# Patient Record
Sex: MALE | Race: BLACK OR AFRICAN AMERICAN | NOT HISPANIC OR LATINO | Employment: FULL TIME | URBAN - METROPOLITAN AREA
[De-identification: names, ages, dates, MRNs, and addresses within clinical notes are randomized per-mention and may not be internally consistent; named-entity substitution may affect disease eponyms.]

---

## 2017-01-10 ENCOUNTER — TRANSCRIBE ORDERS (OUTPATIENT)
Dept: ADMINISTRATIVE | Facility: HOSPITAL | Age: 47
End: 2017-01-10

## 2017-01-10 ENCOUNTER — ALLSCRIPTS OFFICE VISIT (OUTPATIENT)
Dept: OTHER | Facility: OTHER | Age: 47
End: 2017-01-10

## 2017-01-10 DIAGNOSIS — R20.2 PARESTHESIA: Primary | ICD-10-CM

## 2017-01-11 DIAGNOSIS — R20.2 PARESTHESIA OF SKIN: ICD-10-CM

## 2017-01-11 DIAGNOSIS — E11.65 TYPE 2 DIABETES MELLITUS WITH HYPERGLYCEMIA (HCC): ICD-10-CM

## 2017-01-11 DIAGNOSIS — E78.5 HYPERLIPIDEMIA: ICD-10-CM

## 2017-01-11 DIAGNOSIS — Z72.0 TOBACCO USE: ICD-10-CM

## 2017-01-11 DIAGNOSIS — E55.9 VITAMIN D DEFICIENCY: ICD-10-CM

## 2017-01-11 DIAGNOSIS — I10 ESSENTIAL (PRIMARY) HYPERTENSION: ICD-10-CM

## 2017-02-23 ENCOUNTER — GENERIC CONVERSION - ENCOUNTER (OUTPATIENT)
Dept: OTHER | Facility: OTHER | Age: 47
End: 2017-02-23

## 2017-03-07 ENCOUNTER — ALLSCRIPTS OFFICE VISIT (OUTPATIENT)
Dept: OTHER | Facility: OTHER | Age: 47
End: 2017-03-07

## 2017-03-07 DIAGNOSIS — R31.29 OTHER MICROSCOPIC HEMATURIA: ICD-10-CM

## 2017-03-13 ENCOUNTER — GENERIC CONVERSION - ENCOUNTER (OUTPATIENT)
Dept: OTHER | Facility: OTHER | Age: 47
End: 2017-03-13

## 2017-03-20 ENCOUNTER — GENERIC CONVERSION - ENCOUNTER (OUTPATIENT)
Dept: OTHER | Facility: OTHER | Age: 47
End: 2017-03-20

## 2017-03-21 ENCOUNTER — HOSPITAL ENCOUNTER (OUTPATIENT)
Dept: ULTRASOUND IMAGING | Facility: HOSPITAL | Age: 47
Discharge: HOME | End: 2017-03-21
Payer: COMMERCIAL

## 2017-03-21 DIAGNOSIS — R31.29 OTHER MICROSCOPIC HEMATURIA: ICD-10-CM

## 2017-03-21 PROCEDURE — 76770 US EXAM ABDO BACK WALL COMP: CPT

## 2017-03-23 ENCOUNTER — GENERIC CONVERSION - ENCOUNTER (OUTPATIENT)
Dept: OTHER | Facility: OTHER | Age: 47
End: 2017-03-23

## 2017-04-01 ENCOUNTER — HOSPITAL ENCOUNTER (EMERGENCY)
Facility: HOSPITAL | Age: 47
Discharge: HOME | End: 2017-04-01
Attending: EMERGENCY MEDICINE | Admitting: EMERGENCY MEDICINE
Payer: COMMERCIAL

## 2017-04-01 ENCOUNTER — APPOINTMENT (EMERGENCY)
Dept: RADIOLOGY | Facility: HOSPITAL | Age: 47
End: 2017-04-01
Payer: COMMERCIAL

## 2017-04-01 VITALS
HEART RATE: 88 BPM | SYSTOLIC BLOOD PRESSURE: 128 MMHG | RESPIRATION RATE: 20 BRPM | DIASTOLIC BLOOD PRESSURE: 84 MMHG | WEIGHT: 245 LBS | OXYGEN SATURATION: 95 % | TEMPERATURE: 98 F

## 2017-04-01 DIAGNOSIS — R10.9 ABDOMINAL PAIN: ICD-10-CM

## 2017-04-01 DIAGNOSIS — N20.0 KIDNEY STONE: Primary | ICD-10-CM

## 2017-04-01 LAB
ALBUMIN SERPL BCP-MCNC: 4 G/DL (ref 3.5–5)
ALP SERPL-CCNC: 70 U/L (ref 46–116)
ALT SERPL W P-5'-P-CCNC: 31 U/L (ref 12–78)
ANION GAP BLD CALC-SCNC: 15 MMOL/L (ref 4–13)
ANION GAP SERPL CALCULATED.3IONS-SCNC: 7 MMOL/L (ref 4–13)
AST SERPL W P-5'-P-CCNC: 26 U/L (ref 5–45)
BACTERIA UR QL AUTO: ABNORMAL /HPF
BILIRUB SERPL-MCNC: 0.17 MG/DL (ref 0.2–1)
BILIRUB UR QL STRIP: NEGATIVE
BUN BLD-MCNC: 25 MG/DL (ref 5–25)
BUN SERPL-MCNC: 20 MG/DL (ref 5–25)
CA-I BLD-SCNC: 1.16 MMOL/L (ref 1.12–1.32)
CALCIUM SERPL-MCNC: 9.4 MG/DL (ref 8.3–10.1)
CHLORIDE BLD-SCNC: 103 MMOL/L (ref 100–108)
CHLORIDE SERPL-SCNC: 105 MMOL/L (ref 100–108)
CLARITY UR: CLEAR
CO2 SERPL-SCNC: 28 MMOL/L (ref 21–32)
COLOR UR: YELLOW
COLOR, POC: NORMAL
CREAT BLD-MCNC: 1.1 MG/DL (ref 0.6–1.3)
CREAT SERPL-MCNC: 1.31 MG/DL (ref 0.6–1.3)
GFR SERPL CREATININE-BSD FRML MDRD: >60 ML/MIN/1.73SQ M
GFR SERPL CREATININE-BSD FRML MDRD: >60 ML/MIN/1.73SQ M
GLUCOSE SERPL-MCNC: 135 MG/DL (ref 65–140)
GLUCOSE SERPL-MCNC: 144 MG/DL (ref 65–140)
GLUCOSE UR STRIP-MCNC: NEGATIVE MG/DL
HCT VFR BLD CALC: 45 % (ref 36.5–49.3)
HGB BLDA-MCNC: 15.3 G/DL (ref 12–17)
HGB UR QL STRIP.AUTO: ABNORMAL
HYALINE CASTS #/AREA URNS LPF: ABNORMAL /LPF
KETONES UR STRIP-MCNC: NEGATIVE MG/DL
LEUKOCYTE ESTERASE UR QL STRIP: NEGATIVE
LIPASE SERPL-CCNC: 167 U/L (ref 73–393)
NITRITE UR QL STRIP: NEGATIVE
NON-SQ EPI CELLS URNS QL MICRO: ABNORMAL /HPF
PCO2 BLD: 27 MMOL/L (ref 21–32)
PH UR STRIP.AUTO: 5.5 [PH] (ref 4.5–8)
POTASSIUM BLD-SCNC: 4.6 MMOL/L (ref 3.5–5.3)
POTASSIUM SERPL-SCNC: 4.3 MMOL/L (ref 3.5–5.3)
PROT SERPL-MCNC: 8.1 G/DL (ref 6.4–8.2)
PROT UR STRIP-MCNC: NEGATIVE MG/DL
RBC #/AREA URNS AUTO: ABNORMAL /HPF
SODIUM BLD-SCNC: 140 MMOL/L (ref 136–145)
SODIUM SERPL-SCNC: 140 MMOL/L (ref 136–145)
SP GR UR STRIP.AUTO: 1.01 (ref 1–1.03)
SPECIMEN SOURCE: ABNORMAL
UROBILINOGEN UR QL STRIP.AUTO: 0.2 E.U./DL
WBC #/AREA URNS AUTO: ABNORMAL /HPF

## 2017-04-01 PROCEDURE — 74177 CT ABD & PELVIS W/CONTRAST: CPT

## 2017-04-01 PROCEDURE — 85014 HEMATOCRIT: CPT

## 2017-04-01 PROCEDURE — 83690 ASSAY OF LIPASE: CPT | Performed by: EMERGENCY MEDICINE

## 2017-04-01 PROCEDURE — 99284 EMERGENCY DEPT VISIT MOD MDM: CPT

## 2017-04-01 PROCEDURE — 96374 THER/PROPH/DIAG INJ IV PUSH: CPT

## 2017-04-01 PROCEDURE — 80053 COMPREHEN METABOLIC PANEL: CPT | Performed by: EMERGENCY MEDICINE

## 2017-04-01 PROCEDURE — 87086 URINE CULTURE/COLONY COUNT: CPT

## 2017-04-01 PROCEDURE — 81001 URINALYSIS AUTO W/SCOPE: CPT

## 2017-04-01 PROCEDURE — 96361 HYDRATE IV INFUSION ADD-ON: CPT

## 2017-04-01 PROCEDURE — 80047 BASIC METABLC PNL IONIZED CA: CPT

## 2017-04-01 PROCEDURE — 81002 URINALYSIS NONAUTO W/O SCOPE: CPT | Performed by: EMERGENCY MEDICINE

## 2017-04-01 PROCEDURE — 36415 COLL VENOUS BLD VENIPUNCTURE: CPT | Performed by: EMERGENCY MEDICINE

## 2017-04-01 RX ORDER — IBUPROFEN 600 MG/1
600 TABLET ORAL EVERY 6 HOURS PRN
Qty: 30 TABLET | Refills: 0 | Status: SHIPPED | OUTPATIENT
Start: 2017-04-01 | End: 2017-10-14

## 2017-04-01 RX ORDER — KETOROLAC TROMETHAMINE 30 MG/ML
15 INJECTION, SOLUTION INTRAMUSCULAR; INTRAVENOUS ONCE
Status: COMPLETED | OUTPATIENT
Start: 2017-04-01 | End: 2017-04-01

## 2017-04-01 RX ADMIN — SODIUM CHLORIDE 1000 ML: 0.9 INJECTION, SOLUTION INTRAVENOUS at 19:02

## 2017-04-01 RX ADMIN — IOHEXOL 100 ML: 350 INJECTION, SOLUTION INTRAVENOUS at 19:38

## 2017-04-01 RX ADMIN — KETOROLAC TROMETHAMINE 15 MG: 30 INJECTION, SOLUTION INTRAMUSCULAR at 18:59

## 2017-04-02 LAB — BACTERIA UR CULT: NORMAL

## 2017-04-04 ENCOUNTER — TRANSCRIBE ORDERS (OUTPATIENT)
Dept: ADMINISTRATIVE | Facility: HOSPITAL | Age: 47
End: 2017-04-04

## 2017-04-04 DIAGNOSIS — I63.9 CEREBROVASCULAR ACCIDENT (CVA), UNSPECIFIED MECHANISM (HCC): Primary | ICD-10-CM

## 2017-04-04 DIAGNOSIS — M54.10 RADICULOPATHY, UNSPECIFIED SPINAL REGION: ICD-10-CM

## 2017-04-06 ENCOUNTER — TRANSCRIBE ORDERS (OUTPATIENT)
Dept: ADMINISTRATIVE | Facility: HOSPITAL | Age: 47
End: 2017-04-06

## 2017-04-06 DIAGNOSIS — D55.8 ANEMIA DUE TO ENZYME DEFICIENCY (HCC): ICD-10-CM

## 2017-04-06 DIAGNOSIS — M79.10 MYALGIA: ICD-10-CM

## 2017-04-06 DIAGNOSIS — E71.41 PRIMARY CARNITINE DEFICIENCY (HCC): ICD-10-CM

## 2017-04-06 DIAGNOSIS — G45.9 TRANSIENT CEREBRAL ISCHEMIA, UNSPECIFIED TYPE: ICD-10-CM

## 2017-04-06 DIAGNOSIS — R53.1 ASTHENIA: ICD-10-CM

## 2017-04-06 DIAGNOSIS — R29.890 LOSS OF HEIGHT: Primary | ICD-10-CM

## 2017-04-19 ENCOUNTER — GENERIC CONVERSION - ENCOUNTER (OUTPATIENT)
Dept: OTHER | Facility: OTHER | Age: 47
End: 2017-04-19

## 2017-04-22 ENCOUNTER — HOSPITAL ENCOUNTER (OUTPATIENT)
Dept: MRI IMAGING | Facility: HOSPITAL | Age: 47
End: 2017-04-22
Payer: COMMERCIAL

## 2017-04-22 ENCOUNTER — HOSPITAL ENCOUNTER (OUTPATIENT)
Dept: MRI IMAGING | Facility: HOSPITAL | Age: 47
Discharge: HOME | End: 2017-04-22
Payer: COMMERCIAL

## 2017-04-22 DIAGNOSIS — I63.9 CEREBROVASCULAR ACCIDENT (CVA), UNSPECIFIED MECHANISM (HCC): ICD-10-CM

## 2017-04-22 PROCEDURE — 70551 MRI BRAIN STEM W/O DYE: CPT

## 2017-05-11 ENCOUNTER — GENERIC CONVERSION - ENCOUNTER (OUTPATIENT)
Dept: OTHER | Facility: OTHER | Age: 47
End: 2017-05-11

## 2017-06-07 DIAGNOSIS — E55.9 VITAMIN D DEFICIENCY: ICD-10-CM

## 2017-06-07 DIAGNOSIS — Z80.0 FAMILY HISTORY OF MALIGNANT NEOPLASM OF DIGESTIVE ORGAN: ICD-10-CM

## 2017-06-07 DIAGNOSIS — I10 ESSENTIAL (PRIMARY) HYPERTENSION: ICD-10-CM

## 2017-06-07 DIAGNOSIS — E11.65 TYPE 2 DIABETES MELLITUS WITH HYPERGLYCEMIA (HCC): ICD-10-CM

## 2017-06-07 DIAGNOSIS — R20.2 PARESTHESIA OF SKIN: ICD-10-CM

## 2017-06-07 DIAGNOSIS — Z72.0 TOBACCO USE: ICD-10-CM

## 2017-06-07 DIAGNOSIS — R31.29 OTHER MICROSCOPIC HEMATURIA: ICD-10-CM

## 2017-06-07 DIAGNOSIS — E78.5 HYPERLIPIDEMIA: ICD-10-CM

## 2017-06-21 ENCOUNTER — GENERIC CONVERSION - ENCOUNTER (OUTPATIENT)
Dept: OTHER | Facility: OTHER | Age: 47
End: 2017-06-21

## 2017-07-28 ENCOUNTER — HOSPITAL ENCOUNTER (OUTPATIENT)
Dept: ULTRASOUND IMAGING | Facility: HOSPITAL | Age: 47
Discharge: HOME | End: 2017-07-28
Payer: COMMERCIAL

## 2017-07-28 ENCOUNTER — TRANSCRIBE ORDERS (OUTPATIENT)
Dept: ADMINISTRATIVE | Facility: HOSPITAL | Age: 47
End: 2017-07-28

## 2017-07-28 ENCOUNTER — APPOINTMENT (OUTPATIENT)
Dept: LAB | Facility: HOSPITAL | Age: 47
End: 2017-07-28
Payer: COMMERCIAL

## 2017-07-28 DIAGNOSIS — G45.9 TRANSIENT CEREBRAL ISCHEMIA, UNSPECIFIED TYPE: ICD-10-CM

## 2017-07-28 DIAGNOSIS — Z13.9 SCREENING FOR CONDITION: Primary | ICD-10-CM

## 2017-07-28 DIAGNOSIS — Z13.9 SCREENING FOR CONDITION: ICD-10-CM

## 2017-07-28 LAB
CK MB SERPL-MCNC: 3.4 NG/ML (ref 0–5)
CK MB SERPL-MCNC: <1 % (ref 0–2.5)
CK SERPL-CCNC: 996 U/L (ref 39–308)
LACTATE SERPL-SCNC: 0.5 MMOL/L (ref 0.5–2)

## 2017-07-28 PROCEDURE — 86235 NUCLEAR ANTIGEN ANTIBODY: CPT

## 2017-07-28 PROCEDURE — 84210 ASSAY OF PYRUVATE: CPT

## 2017-07-28 PROCEDURE — 83520 IMMUNOASSAY QUANT NOS NONAB: CPT

## 2017-07-28 PROCEDURE — 83605 ASSAY OF LACTIC ACID: CPT

## 2017-07-28 PROCEDURE — 36415 COLL VENOUS BLD VENIPUNCTURE: CPT

## 2017-07-28 PROCEDURE — 82550 ASSAY OF CK (CPK): CPT

## 2017-07-28 PROCEDURE — 93880 EXTRACRANIAL BILAT STUDY: CPT

## 2017-07-28 PROCEDURE — 84182 PROTEIN WESTERN BLOT TEST: CPT

## 2017-07-28 PROCEDURE — 82553 CREATINE MB FRACTION: CPT

## 2017-08-02 LAB
CARN ESTERS/C0 SERPL-SRTO: 0.5 RATIO (ref 0.1–0.9)
CARNITINE FREE SERPL-SCNC: 36 UMOL/L (ref 16–60)
CARNITINE SERPL-SCNC: 54 UMOL/L (ref 25–69)
PYRUVATE BLD-MCNC: 0.2 MG/DL (ref 0.3–0.7)
STONE ANALYSIS-IMP: NORMAL

## 2017-08-10 LAB
MISCELLANEOUS LAB TEST RESULT: NORMAL
MISCELLANEOUS LAB TEST RESULT: NORMAL

## 2017-10-14 ENCOUNTER — APPOINTMENT (EMERGENCY)
Dept: RADIOLOGY | Facility: HOSPITAL | Age: 47
End: 2017-10-14
Payer: COMMERCIAL

## 2017-10-14 ENCOUNTER — HOSPITAL ENCOUNTER (EMERGENCY)
Facility: HOSPITAL | Age: 47
Discharge: HOME | End: 2017-10-14
Attending: EMERGENCY MEDICINE | Admitting: EMERGENCY MEDICINE
Payer: COMMERCIAL

## 2017-10-14 VITALS
WEIGHT: 240 LBS | RESPIRATION RATE: 18 BRPM | TEMPERATURE: 98.3 F | HEART RATE: 98 BPM | SYSTOLIC BLOOD PRESSURE: 199 MMHG | DIASTOLIC BLOOD PRESSURE: 99 MMHG | OXYGEN SATURATION: 98 %

## 2017-10-14 DIAGNOSIS — M17.12 PRIMARY OSTEOARTHRITIS OF LEFT KNEE: ICD-10-CM

## 2017-10-14 DIAGNOSIS — M54.12 CERVICAL RADICULOPATHY: Primary | ICD-10-CM

## 2017-10-14 PROCEDURE — 99283 EMERGENCY DEPT VISIT LOW MDM: CPT

## 2017-10-14 PROCEDURE — 73562 X-RAY EXAM OF KNEE 3: CPT

## 2017-10-14 PROCEDURE — 72040 X-RAY EXAM NECK SPINE 2-3 VW: CPT

## 2017-10-14 RX ORDER — OXYCODONE AND ACETAMINOPHEN 10; 325 MG/1; MG/1
1 TABLET ORAL EVERY 4 HOURS PRN
COMMUNITY

## 2017-10-14 RX ORDER — BACLOFEN 10 MG/1
10 TABLET ORAL 2 TIMES DAILY
COMMUNITY
End: 2018-08-27

## 2017-10-14 NOTE — ED PROCEDURE NOTE
Procedure  Arthrocentesis  Date/Time: 10/14/2017 10:20 AM  Performed by: Gwendolyn Hodge  Authorized by: Crys Armendariz   Consent: Verbal consent obtained  Consent given by: patient  Patient understanding: patient states understanding of the procedure being performed  Site marked: the operative site was marked  Imaging studies: imaging studies available  Patient identity confirmed: verbally with patient  Time out: Immediately prior to procedure a "time out" was called to verify the correct patient, procedure, equipment, support staff and site/side marked as required    Indications: joint swelling and therapeutic   Body area: knee  Joint: left knee  Needle size: 18 G  Ultrasound guidance: no  Approach: lateral  Aspirate: yellow  Comments: 20 ccs yellow non-bloody fluid

## 2017-10-14 NOTE — ED ATTENDING ATTESTATION
Herrera Ang MD, saw and evaluated the patient  I have discussed the patient with the resident/non-physician practitioner and agree with the resident's/non-physician practitioner's findings, Plan of Care, and MDM as documented in the resident's/non-physician practitioner's note, except where noted  All available labs and Radiology studies were reviewed  At this point I agree with the current assessment done in the Emergency Department  I have conducted an independent evaluation of this patient a history and physical is as follows:Exascerbation Chronic muscle     Critical Care Time  CritCare Time  Pains which have been worked up by family medical doctor  Today presents with worsening left arm pain with tingling and numbness  Also has knee pain and knee  Patient has small left knee effusion    Some minimal weakness left upper extremity with positive Spurling sign

## 2017-10-14 NOTE — ED PROVIDER NOTES
History  Chief Complaint   Patient presents with    Knee Pain     Pt states he woke this morning with a swollen L knee pain denies pain in it  Pt also want to have his neck looked at for his pinched nerve  Pt states he had multiple injections in it  38-year-old male with a past medical history diabetes type 2, hypertension, chronic muscle pain, chronic left arm paresthesias presents emergency department for an exacerbation of his left arm paresthesia with shooting pain that originates from the cervical region  Patient states the pain is exacerbated with turning his head and side bending to the left  Patient states he has mild weakness in the left hand, which is chronic  This pain is rated at a 6/10 with radiation down to the fingers  Patient denies any other muscle or nerve type pains and trauma to the neck and Lt upper extremity  Patient states that he does not take any medications currently for his left arm symptoms and has not tried any NSAIDs  Patient also states he does not see a spine doctor, however, he does see pain management for his chronic muscle pains which he takes Percocet for pain as well as baclofen  Patient also complains of left knee effusion x1 day  Patient states that this morning he woke the swelling in the knee was significantly worse than yesterday this is that he has a hard time bending his knee  Patient denies any history of trauma or injury to that extremity  Patient denies history injury to his knee as well as try previous knee injuries in the past   Patient is able to ambulate without pain and has no pain at rest         History provided by:  Patient  Knee Pain   Location:  Knee  Knee location:  L knee  Pain details:     Quality:  Pressure  Associated symptoms: neck pain    Associated symptoms: no back pain and no fatigue        Prior to Admission Medications   Prescriptions Last Dose Informant Patient Reported?  Taking?   baclofen 10 mg tablet   Yes Yes   Sig: Take 10 mg by mouth 2 (two) times a day   oxyCODONE-acetaminophen (PERCOCET)  mg per tablet   Yes Yes   Sig: Take 1 tablet by mouth every 4 (four) hours as needed for moderate pain      Facility-Administered Medications: None       Past Medical History:   Diagnosis Date    Chronic pain     Diabetes mellitus ([facility] [address] )        Past Surgical History:   Procedure Laterality Date    APPENDECTOMY         History reviewed  No pertinent family history  I have reviewed and agree with the history as documented  Social History   Substance Use Topics    Smoking status: Current Every Day Smoker     Packs/day: 1 00     Types: Cigarettes    Smokeless tobacco: Current User    Alcohol use Yes        Review of Systems   Constitutional: Negative for chills and fatigue  Respiratory: Negative for cough, choking, shortness of breath, wheezing and stridor  Cardiovascular: Negative for chest pain, palpitations and leg swelling  Gastrointestinal: Negative for abdominal pain, nausea and vomiting  Musculoskeletal: Positive for joint swelling and neck pain  Negative for back pain, gait problem and myalgias  Skin: Negative for rash  Neurological: Negative for dizziness and headaches  Physical Exam  ED Triage Vitals [10/14/17 0755]   Temperature Pulse Respirations Blood Pressure SpO2   98 3 °F (36 8 °C) 98 18 (!) 199/99 98 %      Temp Source Heart Rate Source Patient Position - Orthostatic VS BP Location FiO2 (%)   Oral Monitor Sitting Right arm --      Pain Score       5           Physical Exam   Constitutional: He is oriented to person, place, and time  He appears well-developed and well-nourished  HENT:   Head: Normocephalic and atraumatic  Eyes: Conjunctivae are normal    Neck:       Tenderness to palpation over the lateral aspect of C4-5 as well as the Trap in that region  Cardiovascular: Normal rate, regular rhythm and normal heart sounds      Pulmonary/Chest: Effort normal and breath sounds normal    Abdominal: Soft  Bowel sounds are normal    Musculoskeletal:        Arms:       Legs:  Neurological: He is alert and oriented to person, place, and time  Skin: Skin is warm and dry  Capillary refill takes less than 2 seconds  No rash noted  Psychiatric: He has a normal mood and affect  His behavior is normal  Thought content normal    Nursing note and vitals reviewed  ED Medications  Medications - No data to display    Diagnostic Studies  Labs Reviewed - No data to display    XR cervical spine 2 or 3 views    (Results Pending)   XR knee 3 views left non injury    (Results Pending)       Procedures  Procedures      Phone Consults  ED Phone Contact    ED Course  ED Course                                MDM  Number of Diagnoses or Management Options  Cervical radiculopathy: established and worsening  Primary osteoarthritis of left knee: new and requires workup  Diagnosis management comments:   1  Osteoarthritis with Left Knee effusion  -Xray confirm osteoarthritis  -effusion drained with arthrocentesis with 25 ccs non-bloody yellow fluid    2  Cervical radiculopathy   -pain films show osteophytes in the cervical region  -f/u with sports medicine for both issues    -Ibuprofen 600mg Q6hr as needed for pain       Amount and/or Complexity of Data Reviewed  Tests in the radiology section of CPT®: reviewed and ordered    Risk of Complications, Morbidity, and/or Mortality  Presenting problems: low  Diagnostic procedures: low  Management options: low      CritCare Time    Disposition  Final diagnoses:   Cervical radiculopathy   Primary osteoarthritis of left knee     ED Disposition     ED Disposition Condition Comment    Discharge  Leonard Torre discharge to home/self care      Condition at discharge: Good        Follow-up Information     Follow up With Specialties Details Why [address], [address], [address] [place], [address]  [facility]

## 2017-10-14 NOTE — DISCHARGE INSTRUCTIONS
Cervical Radiculopathy   WHAT YOU NEED TO KNOW:   Cervical radiculopathy is a painful condition that happens when a spinal nerve in your neck is pinched or irritated  DISCHARGE INSTRUCTIONS:   Medicines: You may need any of the following:  · NSAIDs  help decrease swelling and pain  This medicine can be bought without a doctor's order  This medicine can cause stomach bleeding or kidney problems in certain people  If you take blood thinner medicine, always ask your healthcare provider if NSAIDs are safe for you  Always read the medicine label and follow the directions on it before using this medicine  · Prescription pain medicine  helps decrease pain  Do not wait until the pain is severe before you take this medicine  · Steroids  help decrease pain and swelling  These may be given as a pill or as an injection in your neck  You may need more than 1 injection if your symptoms do not improve after the first treatment  · Take your medicine as directed  Contact your healthcare provider if you think your medicine is not helping or if you have side effects  Tell him of her if you are allergic to any medicine  Keep a list of the medicines, vitamins, and herbs you take  Include the amounts, and when and why you take them  Bring the list or the pill bottles to follow-up visits  Carry your medicine list with you in case of an emergency  Follow up with your healthcare provider or spine specialist as directed:  Write down your questions so you remember to ask them during your visits  Physical therapy:  Your healthcare provider may suggest physical therapy to stretch and strengthen your muscles  Your physical therapist can teach you how to improve your posture and the way you hold your neck  He may also teach you how to be safely active and avoid further injury  He can also help you develop an exercise program that is safe for your back and neck  Self-care:   · Ice  helps decrease swelling and pain   Ice may also help prevent tissue damage  Use an ice pack, or put crushed ice in a plastic bag  Cover it with a towel and place it on your neck for 15 to 20 minutes every hour or as directed  · Rest  when you feel it is needed  Slowly start to do more each day  Return to your daily activities as directed  · Wear a soft collar  You may be given a soft collar to support your neck while you sleep  Wear the soft collar only as directed  · Do light stretches and regular exercise  Your healthcare provider may suggest light stretches to help decrease stiffness in your neck and arm as you recover  After your pain is controlled, you may benefit from regular exercise  Ask what type of exercise is safe for your back and neck  · Review your work area  A comfortable work area can help prevent neck strain  Ask your employer for an ergonomic review to check the position of your desk, chair, phone, and computer  Make any necessary adjustments for your comfort  Contact your healthcare provider or spine specialist if:   · You have a fever  · You are losing weight without trying  · Your pain is worse, even with medicine  · One or both hands feel more numb than before, or you cannot move your fingers well  · You have questions or concerns about your condition or care  © 2017 [address]  Information is for End User's use only and may not be sold, redistributed or otherwise used for commercial purposes  All illustrations and images included in CareNotes® are the copyrighted property of A D A M , Inc  or Surya Woodward  The above information is an  only  It is not intended as medical advice for individual conditions or treatments  Talk to your doctor, nurse or pharmacist before following any medical regimen to see if it is safe and effective for you  Osteoarthritis   Julian LENNON & Brianna Leos: Arthritis, Osteo  In: Benito ALEXANDER, ed  The 5-Minute Clinical Consult 2014, 22nd ed  [facility],[address] [place] [address], 2014  Obdulio A: Osteoarthritis 1: Physiology, risk factors and causes of pain  Nurs Times 2012; 108(7):12-15  Benito ALEXANDER: The 5-Minute Clinical Consult, 20th [facility],[address] [place] [address], 2012  Christoph LUGO: Management of osteoarthritis  Nurs Older People 2011; 23(9):14-19  Joanie Ravinder : The management of osteoarthritis and rheumatoid arthritis    Nurs Times  , 2005; 101(2):28-29  Nadeem DJ, Sp RL : Osteoarthritis    Clin Orthop Relat Res  , 2004; 427 (suppl):183S-189S  Scott DT : Risk factors for osteoarthritis    Clin Orthop Relat Res  , 2004; 427(suppl):16S-21S  Valente Carroll P : Update: treatment of osteoarthritis    Arthritis Rheum  , 2002; 47(6):686-690  [employer] of Rheumatology Subcommittee on Osteoarthritis Guidelines : Recommendations for the medical management of osteoarthritis of the hip and knee: 2000 update    Arthritis Rheum  , 2000; 43(9):2586-3524  My Crook et al : EULAR recommendations for the management of knee osteoarthritis: report of a task force of the Standing Committee for International Clinical Studies Including Therapeutic Trials (ESCISIT)  Rosella Goldmann [address] [place] [address] , 2000; 07(83):331-115  Loyd Sage et al  : Regular review: medical management of osteoarthritis    BMJ  , 2000; 321(1292):936-940  [facility] : Osteoarthritis  [place]  , 1997; 253:820-626  © 2017 [address]  Information is for End User's use only and may not be sold, redistributed or otherwise used for commercial purposes  All illustrations and images included in CareNotes® are the copyrighted property of [employer]  or Surya Woodward  The above information is an  only  It is not intended as medical advice for individual conditions or treatments   Talk to your doctor, nurse or pharmacist before following any medical regimen to see if it is safe and effective for you

## 2017-11-06 ENCOUNTER — GENERIC CONVERSION - ENCOUNTER (OUTPATIENT)
Dept: OTHER | Facility: OTHER | Age: 47
End: 2017-11-06

## 2017-11-06 DIAGNOSIS — Z72.0 TOBACCO USE: ICD-10-CM

## 2017-11-06 DIAGNOSIS — M50.20 OTHER CERVICAL DISC DISPLACEMENT, UNSPECIFIED CERVICAL REGION: ICD-10-CM

## 2017-11-06 DIAGNOSIS — R20.2 PARESTHESIA OF SKIN: ICD-10-CM

## 2017-11-06 DIAGNOSIS — E55.9 VITAMIN D DEFICIENCY: ICD-10-CM

## 2017-11-06 DIAGNOSIS — E11.65 TYPE 2 DIABETES MELLITUS WITH HYPERGLYCEMIA (HCC): ICD-10-CM

## 2017-11-06 DIAGNOSIS — E78.5 HYPERLIPIDEMIA: ICD-10-CM

## 2017-11-06 DIAGNOSIS — M54.12 RADICULOPATHY OF CERVICAL REGION: ICD-10-CM

## 2017-11-06 DIAGNOSIS — R31.29 OTHER MICROSCOPIC HEMATURIA: ICD-10-CM

## 2017-11-06 DIAGNOSIS — Z80.0 FAMILY HISTORY OF MALIGNANT NEOPLASM OF DIGESTIVE ORGAN: ICD-10-CM

## 2017-11-06 DIAGNOSIS — G89.29 OTHER CHRONIC PAIN: ICD-10-CM

## 2017-11-06 DIAGNOSIS — I10 ESSENTIAL (PRIMARY) HYPERTENSION: ICD-10-CM

## 2017-11-08 ENCOUNTER — GENERIC CONVERSION - ENCOUNTER (OUTPATIENT)
Dept: FAMILY MEDICINE CLINIC | Facility: CLINIC | Age: 47
End: 2017-11-08

## 2017-11-24 ENCOUNTER — ALLSCRIPTS OFFICE VISIT (OUTPATIENT)
Dept: OTHER | Facility: OTHER | Age: 47
End: 2017-11-24

## 2018-01-10 NOTE — RESULT NOTES
Verified Results  US KIDNEY AND BLADDER 21Mar2017 07:54AM Patricia Douglas Order Number: [id number]    - Patient Instructions: To schedule this appointment, please contact Central Scheduling at [phone]  Test Name Result Flag Reference   US KIDNEY AND BLADDER (Report)     RENAL ULTRASOUND     INDICATION: Microhematuria, urinary frequency     COMPARISON: Renal ultrasound 6/23/2014     TECHNIQUE:  Ultrasound of the retroperitoneum was performed with a curvilinear transducer utilizing volumetric sweeps and still imaging techniques  FINDINGS:     KIDNEYS:   Symmetric and normal size  Right kidney: 12 1 x 5 1 cm  Normal echogenicity and contour  No suspicious masses detected  1 6 x 1 1 x 1 2 cm simple cyst is again seen in the midpole  No hydronephrosis  No shadowing calculi  No perinephric fluid collections  Left kidney: 11 4 x 6 1 cm  Normal echogenicity and contour  No suspicious masses detected  No hydronephrosis  No shadowing calculi  No perinephric fluid collections  URETERS:   Nonvisualized  BLADDER:    Normally distended  No focal thickening or mass lesions  Bilateral ureteral jets detected  IMPRESSION:       1  No significant change in simple cyst within the right kidney  2  No hydronephrosis  3  Normal bladder          Workstation performed: [id number]     Signed by:   Thierno San MD   3/23/17

## 2018-01-11 NOTE — MISCELLANEOUS
Provider Comments  Provider Comments:   Patient did not show for his scheduled visit nor did he complete his assigned laboratories      Signatures   Electronically signed by : Nirali Wahl DO; Nov 24 2017  7:51AM EST                       (Author)

## 2018-01-11 NOTE — RESULT NOTES
Message   Recorded as Task   Date: 02/23/2017 02:43 PM, Created By: Greg Tyler   Task Name: Call Back   Assigned To: Garland and Otto,Clinical Team   Regarding Patient: Cecy Pittman, Status:  In Progress   Comment:    Greg Tyler - 23 Feb 2017 2:43 PM     TASK CREATED  Patient needs appointment in the next week or so his diabetes, cholesterol vitamin D levels are not at goal   Tere Santoyo - 23 Feb 2017 3:23 PM     TASK IN PROGRESS   Tere Santoyo - 23 Feb 2017 3:36 PM     TASK EDITED  pt notified of need for appointment - scheduled 7 March 2017     Signatures   Electronically signed by : Haily Cuenca, ; Feb 23 2017  3:36PM EST                       (Author)

## 2018-01-13 VITALS
WEIGHT: 245 LBS | HEART RATE: 88 BPM | BODY MASS INDEX: 37.13 KG/M2 | HEIGHT: 68 IN | DIASTOLIC BLOOD PRESSURE: 64 MMHG | SYSTOLIC BLOOD PRESSURE: 120 MMHG

## 2018-01-14 VITALS
DIASTOLIC BLOOD PRESSURE: 50 MMHG | HEART RATE: 84 BPM | BODY MASS INDEX: 36.83 KG/M2 | SYSTOLIC BLOOD PRESSURE: 120 MMHG | WEIGHT: 243 LBS | HEIGHT: 68 IN

## 2018-01-16 NOTE — MISCELLANEOUS
Message  pt called stating this morning his left eye was blurry  No pain, discharge,headache,etc  Patient's right eye is fine  Told patient that if it continues to go to the Urgent care or if any other new symptoms occur to go right away to the ER  Pt agreeable  ak      Active Problems    1  History of Abnormal liver enzymes (790 5) (R74 8)   2  Arm paresthesia, left (782 0) (R20 2)   3  Family history of colon cancer (V16 0) (Z80 0)   4  Hematuria, microscopic (599 72) (R31 29)   5  Hyperlipidemia (272 4) (E78 5)   6  Hypertension (401 9) (I10)   7  Lumbar radiculopathy (724 4) (M54 16)   8  Muscle weakness (728 87) (M62 81)   9  Myalgia and myositis (729 1)   10  Other chronic pain (338 29) (G89 29)   11  Pain of lower leg, unspecified laterality (729 5) (M79 669)   12  Tobacco abuse (305 1) (Z72 0)   13  Type 2 diabetes mellitus with hyperglycemia (250 00) (E11 65)   14  Vitamin D deficiency (268 9) (E55 9)    Current Meds   1  Baclofen 10 MG Oral Tablet; Therapy: 76ASW2205 to (77 873 135) Recorded   2  Marva Contour Next Test In [employer]; USE AS DIRECTED; Therapy: 85HHB4385 to (Evaluate:16Oct2015)  Requested for: 16Sep2015; Last   Rx:15Bfg9138 Ordered   3  Marva Microlet Lancets Miscellaneous; TEST DAILY; Therapy: 36RIK0426 to (Last Rx:16Sep2015)  Requested for: 69Jbc7482 Ordered   4  Lisinopril-Hydrochlorothiazide 20-25 MG Oral Tablet; TAKE 1 TABLET BY MOUTH DAILY; Therapy: 00GMS9026 to (Jluis Kim)  Requested for: 29MNU7999; Last   Rx:10Jan2017 Ordered   5  MetFORMIN HCl  MG Oral Tablet Extended Release 24 Hour; Take one tablet   daily with evening meal for one week then 2 tablets daily with evening meal;   Therapy: 27ZXJ0501 to (Last Rx:07Mar2017)  Requested for: 21GWB5410 Ordered   6  Percocet  MG Oral Tablet (Oxycodone-Acetaminophen); Therapy: ([phone]) to Recorded   7  Pravastatin Sodium 40 MG Oral Tablet; Take 1 tablet daily;    Therapy: 07VFI8169 to (Last Rx:07Mar2017)  Requested for: 30EQC4993 Ordered   8  Vitamin D (Ergocalciferol) 66449 UNIT Oral Capsule; TAKE 1 CAPSULE 2 TIMES A   WEEK; Therapy: 70VIH5169 to (Last Rx:07Mar2017)  Requested for: 44ZZZ9309 Ordered    Allergies    1  No Known Drug Allergies    Signatures   Electronically signed by :  Ankur Griffith, ; Mar 13 2017 11:33AM EST                       (Author)

## 2018-01-17 NOTE — MISCELLANEOUS
Provider Comments  Provider Comments:   Patient did not show for scheduled appointment  Patient's overdue for blood work   Of my office contact patient to complete blood work and make follow-up appointment      Signatures   Electronically signed by : Jessica Barrera DO; Jul 25 2016  8:58AM EST                       (Author)

## 2018-01-18 NOTE — MISCELLANEOUS
Provider Comments  Provider Comments:   Dear Zan Jarrett,    You missed your new patient appointment with Dr Rajesh Momin on 04/18/2017; please contact our office to reschedule at [phone]  We understand that many situations arise that occasionally prevents patients from keeping scheduled appointments  It is the policy of [facility] that patients notify us 24 hours in advance if unable to keep a scheduled appointment  Missed appointments jeopardize strong physician-patient relationships  The appointment you missed could have easily been made available to another patient if you had contacted us to cancel  We like to accommodate all of our patients, but when patients miss an appointment it prevents us from being able to help everyone  In the future, we request at least 24 hours' notice of cancellation so we can make your appointment available to someone else in need  Sincerely,    The Physicians and Staff of [facility]          Signatures   Electronically signed by :  Harris Garcia, ; Apr 19 2017  3:51PM EST                       (Author)

## 2018-01-22 VITALS
SYSTOLIC BLOOD PRESSURE: 116 MMHG | DIASTOLIC BLOOD PRESSURE: 60 MMHG | WEIGHT: 226 LBS | HEART RATE: 88 BPM | HEIGHT: 68 IN | BODY MASS INDEX: 34.25 KG/M2

## 2018-05-21 RX ORDER — LISINOPRIL AND HYDROCHLOROTHIAZIDE 25; 20 MG/1; MG/1
TABLET ORAL
Qty: 30 TABLET | Refills: 5 | OUTPATIENT
Start: 2018-05-21

## 2018-06-29 RX ORDER — METFORMIN HYDROCHLORIDE 500 MG/1
TABLET, EXTENDED RELEASE ORAL
Qty: 180 TABLET | Refills: 1 | OUTPATIENT
Start: 2018-06-29

## 2018-08-27 ENCOUNTER — HOSPITAL ENCOUNTER (EMERGENCY)
Facility: HOSPITAL | Age: 48
Discharge: HOME | End: 2018-08-27
Attending: EMERGENCY MEDICINE
Payer: COMMERCIAL

## 2018-08-27 VITALS
HEART RATE: 66 BPM | DIASTOLIC BLOOD PRESSURE: 78 MMHG | WEIGHT: 180 LBS | TEMPERATURE: 98.6 F | BODY MASS INDEX: 28.25 KG/M2 | HEIGHT: 67 IN | SYSTOLIC BLOOD PRESSURE: 123 MMHG | OXYGEN SATURATION: 98 % | RESPIRATION RATE: 18 BRPM

## 2018-08-27 DIAGNOSIS — R10.9 ABDOMINAL PAIN: Primary | ICD-10-CM

## 2018-08-27 DIAGNOSIS — E11.9 DIABETES (HCC): ICD-10-CM

## 2018-08-27 DIAGNOSIS — K21.9 GERD (GASTROESOPHAGEAL REFLUX DISEASE): ICD-10-CM

## 2018-08-27 LAB
ALBUMIN SERPL BCP-MCNC: 4.2 G/DL (ref 3.5–5)
ALP SERPL-CCNC: 63 U/L (ref 46–116)
ALT SERPL W P-5'-P-CCNC: 21 U/L (ref 12–78)
ANION GAP SERPL CALCULATED.3IONS-SCNC: 5 MMOL/L (ref 4–13)
AST SERPL W P-5'-P-CCNC: 21 U/L (ref 5–45)
BASOPHILS # BLD AUTO: 0.04 THOUSANDS/ΜL (ref 0–0.1)
BASOPHILS NFR BLD AUTO: 0 % (ref 0–1)
BILIRUB SERPL-MCNC: 0.47 MG/DL (ref 0.2–1)
BUN SERPL-MCNC: 19 MG/DL (ref 5–25)
CALCIUM SERPL-MCNC: 9.6 MG/DL (ref 8.3–10.1)
CHLORIDE SERPL-SCNC: 102 MMOL/L (ref 100–108)
CO2 SERPL-SCNC: 31 MMOL/L (ref 21–32)
CREAT SERPL-MCNC: 1.18 MG/DL (ref 0.6–1.3)
EOSINOPHIL # BLD AUTO: 0.09 THOUSAND/ΜL (ref 0–0.61)
EOSINOPHIL NFR BLD AUTO: 1 % (ref 0–6)
ERYTHROCYTE [DISTWIDTH] IN BLOOD BY AUTOMATED COUNT: 15.7 % (ref 11.6–15.1)
GFR SERPL CREATININE-BSD FRML MDRD: 83 ML/MIN/1.73SQ M
GLUCOSE SERPL-MCNC: 95 MG/DL (ref 65–140)
HCT VFR BLD AUTO: 44.3 % (ref 36.5–49.3)
HGB BLD-MCNC: 14.1 G/DL (ref 12–17)
IMM GRANULOCYTES # BLD AUTO: 0.02 THOUSAND/UL (ref 0–0.2)
IMM GRANULOCYTES NFR BLD AUTO: 0 % (ref 0–2)
LIPASE SERPL-CCNC: 79 U/L (ref 73–393)
LYMPHOCYTES # BLD AUTO: 2.95 THOUSANDS/ΜL (ref 0.6–4.47)
LYMPHOCYTES NFR BLD AUTO: 30 % (ref 14–44)
MCH RBC QN AUTO: 28.5 PG (ref 26.8–34.3)
MCHC RBC AUTO-ENTMCNC: 31.8 G/DL (ref 31.4–37.4)
MCV RBC AUTO: 90 FL (ref 82–98)
MONOCYTES # BLD AUTO: 0.69 THOUSAND/ΜL (ref 0.17–1.22)
MONOCYTES NFR BLD AUTO: 7 % (ref 4–12)
NEUTROPHILS # BLD AUTO: 6 THOUSANDS/ΜL (ref 1.85–7.62)
NEUTS SEG NFR BLD AUTO: 62 % (ref 43–75)
NRBC BLD AUTO-RTO: 0 /100 WBCS
PLATELET # BLD AUTO: 220 THOUSANDS/UL (ref 149–390)
PMV BLD AUTO: 10.9 FL (ref 8.9–12.7)
POTASSIUM SERPL-SCNC: 3.5 MMOL/L (ref 3.5–5.3)
PROT SERPL-MCNC: 8.2 G/DL (ref 6.4–8.2)
RBC # BLD AUTO: 4.95 MILLION/UL (ref 3.88–5.62)
SODIUM SERPL-SCNC: 138 MMOL/L (ref 136–145)
WBC # BLD AUTO: 9.79 THOUSAND/UL (ref 4.31–10.16)

## 2018-08-27 PROCEDURE — 85025 COMPLETE CBC W/AUTO DIFF WBC: CPT | Performed by: EMERGENCY MEDICINE

## 2018-08-27 PROCEDURE — 80053 COMPREHEN METABOLIC PANEL: CPT | Performed by: EMERGENCY MEDICINE

## 2018-08-27 PROCEDURE — 36415 COLL VENOUS BLD VENIPUNCTURE: CPT

## 2018-08-27 PROCEDURE — 83690 ASSAY OF LIPASE: CPT | Performed by: EMERGENCY MEDICINE

## 2018-08-27 PROCEDURE — 99284 EMERGENCY DEPT VISIT MOD MDM: CPT

## 2018-08-27 RX ORDER — DICYCLOMINE HCL 20 MG
20 TABLET ORAL ONCE
Status: COMPLETED | OUTPATIENT
Start: 2018-08-27 | End: 2018-08-27

## 2018-08-27 RX ORDER — OMEPRAZOLE 20 MG/1
20 CAPSULE, DELAYED RELEASE ORAL DAILY
COMMUNITY

## 2018-08-27 RX ADMIN — DICYCLOMINE HYDROCHLORIDE 20 MG: 20 TABLET ORAL at 19:15

## 2018-08-27 NOTE — ED PROVIDER NOTES
History  Chief Complaint   Patient presents with    Abdominal Pain     Patient complaining of abdominal pain for a few months and was seen on Friday at [address] [place] [address] [address] and again today but continues with pain and "tightness"  Also complains of nausea  Did have blood work Friday but not again today  75-year-old male presents for evaluation of ongoing abdominal discomfort  Patient complains that he has been having a pressure sensation in his abdomen for the last few months and on last Friday it flared up  At that time he felt pain diffusely across his entire abdomen with no specific radiations, exacerbating or relieving factors, as well as shortness of breath due to the sensation  At that time he went into another emergency room for evaluation was provided with Bentyl and Zofran for his symptom control  Patient then felt well the next 2 days and this morning felt the discomfort once again so he returned to the emergency department for evaluation  At that facility they once again gave him Bentyl and Zofran and advised him to get a EGD done for further investigation  He was still concerned that something else could be going on so he has come into the emergency department here to be re-evaluated once again  In addition to the diffuse pressure sensation the patient also endorses nausea without vomiting and feels that there is some abdominal protrusion on the right side of his abdomen  Patient states that he did have a bowel movement this morning however he had to force himself to go and has been feeling constipated over the last couple of days  Patient has a past medical history significant for GERD, diabetes, muscular dystrophy  Patient denies any headaches, change in vision, chest pain, palpitations, cough, or change in urinary habits  Prior to Admission Medications   Prescriptions Last Dose Informant Patient Reported?  Taking?   metFORMIN (GLUCOPHAGE) 500 mg tablet 8/27/2018 at Unknown time  Yes Yes Sig: Take 500 mg by mouth 2 (two) times a day with meals   omeprazole (PriLOSEC) 20 mg delayed release capsule 8/27/2018 at Unknown time  Yes Yes   Sig: Take 20 mg by mouth daily   oxyCODONE-acetaminophen (PERCOCET)  mg per tablet 8/27/2018 at Unknown time  Yes Yes   Sig: Take 1 tablet by mouth every 4 (four) hours as needed for moderate pain      Facility-Administered Medications: None       Past Medical History:   Diagnosis Date    Chronic pain     Diabetes mellitus ([facility] [address] )        Past Surgical History:   Procedure Laterality Date    APPENDECTOMY         History reviewed  No pertinent family history  I have reviewed and agree with the history as documented  Social History   Substance Use Topics    Smoking status: Current Every Day Smoker     Packs/day: 1 00     Types: Cigarettes    Smokeless tobacco: Current User    Alcohol use No        Review of Systems   Constitutional: Negative for activity change, chills, fatigue and fever  Respiratory: Positive for shortness of breath  Negative for cough  Cardiovascular: Negative for chest pain and palpitations  Gastrointestinal: Positive for abdominal pain, constipation and nausea  Negative for abdominal distention, blood in stool, diarrhea and vomiting  Genitourinary: Negative for dysuria, enuresis, hematuria and urgency  Musculoskeletal: Negative for arthralgias, back pain, gait problem, myalgias and neck pain  Neurological: Negative for dizziness, syncope, light-headedness and headaches  All other systems reviewed and are negative        Physical Exam  ED Triage Vitals [08/27/18 1607]   Temperature Pulse Respirations Blood Pressure SpO2   98 6 °F (37 °C) 85 18 154/87 97 %      Temp Source Heart Rate Source Patient Position - Orthostatic VS BP Location FiO2 (%)   Tympanic Monitor Sitting Right arm --      Pain Score       7           Orthostatic Vital Signs  Vitals:    08/27/18 1607 08/27/18 1828 08/27/18 1900 08/27/18 2004   BP: 154/87 157/84 129/84 123/78   Pulse: 85 71 70 66   Patient Position - Orthostatic VS: Sitting          Physical Exam   Constitutional: He is oriented to person, place, and time  He appears well-developed and well-nourished  No distress  HENT:   Head: Normocephalic and atraumatic  Right Ear: External ear normal    Left Ear: External ear normal    Nose: Nose normal    Mouth/Throat: Oropharynx is clear and moist    Eyes: Conjunctivae and EOM are normal  Pupils are equal, round, and reactive to light  Right eye exhibits no discharge  Left eye exhibits no discharge  Neck: Normal range of motion  Neck supple  No JVD present  No tracheal deviation present  Cardiovascular: Normal rate, regular rhythm, normal heart sounds and intact distal pulses  Exam reveals no gallop and no friction rub  No murmur heard  Pulmonary/Chest: Effort normal and breath sounds normal  No respiratory distress  He has no wheezes  He has no rales  Abdominal: Soft  Bowel sounds are normal  He exhibits no distension and no mass  There is tenderness  There is no rebound and no guarding  No hernia  Patient source tenderness in the right upper quadrant as well as epigastric region with palpation  The abdominal protrusion on the right side of the patient's abdomen appears to be most likely the unequal hypertrophy of abdominal muscles  No distinct mass or hernia was appreciated  Musculoskeletal: Normal range of motion  He exhibits no tenderness or deformity  Neurological: He is alert and oriented to person, place, and time  No cranial nerve deficit or sensory deficit  He exhibits normal muscle tone  Coordination normal    Skin: He is not diaphoretic  Psychiatric: He has a normal mood and affect  His behavior is normal  Judgment and thought content normal    Vitals reviewed        ED Medications  Medications   dicyclomine (BENTYL) tablet 20 mg (20 mg Oral Given 8/27/18 1915)       Diagnostic Studies  Results Reviewed     Procedure Component Value Units Date/Time    Comprehensive metabolic panel [[id number]] Collected:  08/27/18 1829    Lab Status:  Final result Specimen:  Blood from Arm, Right Updated:  08/27/18 1902     Sodium 138 mmol/L      Potassium 3 5 mmol/L      Chloride 102 mmol/L      CO2 31 mmol/L      ANION GAP 5 mmol/L      BUN 19 mg/dL      Creatinine 1 18 mg/dL      Glucose 95 mg/dL      Calcium 9 6 mg/dL      AST 21 U/L      ALT 21 U/L      Alkaline Phosphatase 63 U/L      Total Protein 8 2 g/dL      Albumin 4 2 g/dL      Total Bilirubin 0 47 mg/dL      eGFR 83 ml/min/1 73sq m     Narrative:         National Kidney Disease Education Program recommendations are as follows:  GFR calculation is accurate only with a steady state creatinine  Chronic Kidney disease less than 60 ml/min/1 73 sq  meters  Kidney failure less than 15 ml/min/1 73 sq  meters      Lipase [[id number]]  (Normal) Collected:  08/27/18 1829    Lab Status:  Final result Specimen:  Blood from Arm, Right Updated:  08/27/18 1902     Lipase 79 u/L     CBC and differential [[id number]]  (Abnormal) Collected:  08/27/18 1829    Lab Status:  Final result Specimen:  Blood from Arm, Right Updated:  08/27/18 1842     WBC 9 79 Thousand/uL      RBC 4 95 Million/uL      Hemoglobin 14 1 g/dL      Hematocrit 44 3 %      MCV 90 fL      MCH 28 5 pg      MCHC 31 8 g/dL      RDW 15 7 (H) %      MPV 10 9 fL      Platelets 662 Thousands/uL      nRBC 0 /100 WBCs      Neutrophils Relative 62 %      Immat GRANS % 0 %      Lymphocytes Relative 30 %      Monocytes Relative 7 %      Eosinophils Relative 1 %      Basophils Relative 0 %      Neutrophils Absolute 6 00 Thousands/µL      Immature Grans Absolute 0 02 Thousand/uL      Lymphocytes Absolute 2 95 Thousands/µL      Monocytes Absolute 0 69 Thousand/µL      Eosinophils Absolute 0 09 Thousand/µL      Basophils Absolute 0 04 Thousands/µL                  No orders to display         Procedures  Procedures      Phone Consults  ED Phone Contact    ED Course                               MDM  Number of Diagnoses or Management Options  Abdominal pain:   Diabetes [facility]):   GERD (gastroesophageal reflux disease):   Diagnosis management comments: 43-year-old male comes in for evaluation of ongoing abdominal pain  Lipase, CBC, CMP were all unremarkable  Bedside ultrasound revealed no dilation of the common bile duct, inflammation of the gallbladder, or gallstones  Additionally no compromise of the outer abdominal wall was appreciated nor loops of bowel protruding  Patient was provided with some symptom relief by Bentyl which he said helped improve the sensation he was having in his abdomen  Extensive conversation was had regarding the possible causes of the discomfort that he is having an while would need to be done for further evaluation  The importance of following up with a primary care provider as well as best getting a possible EGD to rule out a gastric issue was discussed as well as how inflammation of the stomach or esophagus could cause shortness of breath type symptoms as well as the pressure that he is feeling  Patient remarks that he currently has a prescription for Bentyl to use for symptom relief and does not need any additional   Patient was instructed to return if he started to have severe abdominal pain, the pain radiated to his back or up into his shoulder arms, started to have fever chills with this pain, where he noted blood or very dark stools      CritCare Time    Disposition  Final diagnoses:   Abdominal pain   GERD (gastroesophageal reflux disease)   Diabetes ([facility] )     Time reflects when diagnosis was documented in both MDM as applicable and the Disposition within this note     Time User Action Codes Description Comment    8/27/2018  8:42 PM Manjinder Damico Add [R10 9] Abdominal pain     8/27/2018  8:42 PM Manjinder Damico Add [K21 9] GERD (gastroesophageal reflux disease)     8/27/2018  8:42 PM Manjinder Damico Add [E11 9] Diabetes ([facility] [address] ) ED Disposition     ED Disposition Condition Comment    Discharge  Alfreda Cline discharge to home/self care  Condition at discharge: Good        Follow-up Information     Follow up With Specialties Details Why Contact Info Additional [address] Emergency Department Emergency Medicine  If symptoms worsen [address]  [phone]  ED, [address] [place] [address], [id number]          Discharge Medication List as of 8/27/2018  8:43 PM      CONTINUE these medications which have NOT CHANGED    Details   metFORMIN (GLUCOPHAGE) 500 mg tablet Take 500 mg by mouth 2 (two) times a day with meals, Historical Med      omeprazole (PriLOSEC) 20 mg delayed release capsule Take 20 mg by mouth daily, Historical Med      oxyCODONE-acetaminophen (PERCOCET)  mg per tablet Take 1 tablet by mouth every 4 (four) hours as needed for moderate pain, Historical Med           No discharge procedures on file  ED Provider  Attending physically available and evaluated Alfreda Cline I managed the patient along with the ED Attending      Electronically Signed by         Levar Ortiz MD  08/27/18 2642

## 2018-08-28 NOTE — DISCHARGE INSTRUCTIONS

## 2018-08-28 NOTE — ED ATTENDING ATTESTATION
Melvi Hanson MD, saw and evaluated the patient  I have discussed the patient with the resident/non-physician practitioner and agree with the resident's/non-physician practitioner's findings, Plan of Care, and MDM as documented in the resident's/non-physician practitioner's note, except where noted  All available labs and Radiology studies were reviewed  At this point I agree with the current assessment done in the Emergency Department  I have conducted an independent evaluation of this patient including a focused history and a physical exam       29-year-old male presenting to the emergency department for evaluation of abdominal discomfort  This has been waxing and waning over the past 3 days  The patient has had 2 emergency department visits before coming to the emergency department tonight  During this emergency department visits the patient has had normal lab work, normal CAT scan, and 2 normal x-rays  Patient states that he was very concerned about the abdominal discomfort came to the emergency department tonight because it has continued  Patient denies any nausea or vomiting  Patient does report some intermittent light chest tightness  No fever, chills, cough  Ten systems reviewed negative except as noted in the history of present illness  The patient is resting comfortably on a stretcher in no acute respiratory distress  The patient appears nontoxic  HEENT reveals moist mucous membranes  Head is normocephalic and atraumatic  Conjunctiva and sclera are normal  Neck is nontender and supple with full range of motion to flexion, extension, lateral rotation  No meningismus appreciated  No masses are appreciated  Lungs are clear to auscultation bilaterally without any wheezes, rales or rhonchi  Heart is regular rate and rhythm without any murmurs, rubs or gallops  Abdomen is soft and nontender without any rebound or guarding  No masses or asymmetry appreciated   Extremities appear grossly normal without any significant arthropathy  Patient is awake, alert, and oriented x3  The patient has normal interaction  Motor is 5 out of 5  Assessment and plan:  Nonspecific abdominal discomfort localized by the patient to the right upper quadrant epigastric region without any significant tenderness on exam   Bedside ultrasound demonstrates gallbladder which is without stones and of normal size      Labs Reviewed   CBC AND DIFFERENTIAL - Abnormal        Result Value Ref Range Status    WBC 9 79  4 31 - 10 16 Thousand/uL Final    RBC 4 95  3 88 - 5 62 Million/uL Final    Hemoglobin 14 1  12 0 - 17 0 g/dL Final    Hematocrit 44 3  36 5 - 49 3 % Final    MCV 90  82 - 98 fL Final    MCH 28 5  26 8 - 34 3 pg Final    MCHC 31 8  31 4 - 37 4 g/dL Final    RDW 15 7 (*) 11 6 - 15 1 % Final    MPV 10 9  8 9 - 12 7 fL Final    Platelets 543  509 - 390 Thousands/uL Final    nRBC 0  /100 WBCs Final    Neutrophils Relative 62  43 - 75 % Final    Immat GRANS % 0  0 - 2 % Final    Lymphocytes Relative 30  14 - 44 % Final    Monocytes Relative 7  4 - 12 % Final    Eosinophils Relative 1  0 - 6 % Final    Basophils Relative 0  0 - 1 % Final    Neutrophils Absolute 6 00  1 85 - 7 62 Thousands/µL Final    Immature Grans Absolute 0 02  0 00 - 0 20 Thousand/uL Final    Lymphocytes Absolute 2 95  0 60 - 4 47 Thousands/µL Final    Monocytes Absolute 0 69  0 17 - 1 22 Thousand/µL Final    Eosinophils Absolute 0 09  0 00 - 0 61 Thousand/µL Final    Basophils Absolute 0 04  0 00 - 0 10 Thousands/µL Final   LIPASE - Normal    Lipase 79  73 - 393 u/L Final   COMPREHENSIVE METABOLIC PANEL    Sodium 366  136 - 145 mmol/L Final    Potassium 3 5  3 5 - 5 3 mmol/L Final    Chloride 102  100 - 108 mmol/L Final    CO2 31  21 - 32 mmol/L Final    ANION GAP 5  4 - 13 mmol/L Final    BUN 19  5 - 25 mg/dL Final    Creatinine 1 18  0 60 - 1 30 mg/dL Final    Comment: Standardized to IDMS reference method    Glucose 95  65 - 140 mg/dL Final Comment:   If the patient is fasting, the ADA then defines impaired fasting glucose as > 100 mg/dL and diabetes as > or equal to 123 mg/dL  Specimen collection should occur prior to Sulfasalazine administration due to the potential for falsely depressed results  Specimen collection should occur prior to Sulfapyridine administration due to the potential for falsely elevated results  Calcium 9 6  8 3 - 10 1 mg/dL Final    AST 21  5 - 45 U/L Final    Comment:   Specimen collection should occur prior to Sulfasalazine administration due to the potential for falsely depressed results  ALT 21  12 - 78 U/L Final    Comment:   Specimen collection should occur prior to Sulfasalazine and/or Sulfapyridine administration due to the potential for falsely depressed results  Alkaline Phosphatase 63  46 - 116 U/L Final    Total Protein 8 2  6 4 - 8 2 g/dL Final    Albumin 4 2  3 5 - 5 0 g/dL Final    Total Bilirubin 0 47  0 20 - 1 00 mg/dL Final    eGFR 83  ml/min/1 73sq m Final    Narrative:     National Kidney Disease Education Program recommendations are as follows:  GFR calculation is accurate only with a steady state creatinine  Chronic Kidney disease less than 60 ml/min/1 73 sq  meters  Kidney failure less than 15 ml/min/1 73 sq  meters  Likely functional abdominal pain  Could represent constipation  Recommend Bentyl and increased fiber  If continued abdominal discomfort recommend following up with gastroenterology